# Patient Record
Sex: FEMALE | Race: WHITE | NOT HISPANIC OR LATINO | Employment: OTHER | URBAN - METROPOLITAN AREA
[De-identification: names, ages, dates, MRNs, and addresses within clinical notes are randomized per-mention and may not be internally consistent; named-entity substitution may affect disease eponyms.]

---

## 2024-03-21 ENCOUNTER — OFFICE VISIT (OUTPATIENT)
Dept: GASTROENTEROLOGY | Facility: CLINIC | Age: 69
End: 2024-03-21
Payer: MEDICARE

## 2024-03-21 VITALS — WEIGHT: 127 LBS | HEART RATE: 72 BPM | BODY MASS INDEX: 21.16 KG/M2 | HEIGHT: 65 IN

## 2024-03-21 DIAGNOSIS — K21.00 GASTROESOPHAGEAL REFLUX DISEASE WITH ESOPHAGITIS WITHOUT HEMORRHAGE: Primary | ICD-10-CM

## 2024-03-21 DIAGNOSIS — K58.2 IRRITABLE BOWEL SYNDROME WITH BOTH CONSTIPATION AND DIARRHEA: ICD-10-CM

## 2024-03-21 PROCEDURE — 99214 OFFICE O/P EST MOD 30 MIN: CPT | Performed by: INTERNAL MEDICINE

## 2024-03-21 PROCEDURE — 1159F MED LIST DOCD IN RCRD: CPT | Performed by: INTERNAL MEDICINE

## 2024-03-21 PROCEDURE — 1036F TOBACCO NON-USER: CPT | Performed by: INTERNAL MEDICINE

## 2024-03-21 RX ORDER — HYDROXYCHLOROQUINE SULFATE 200 MG/1
300 TABLET, FILM COATED ORAL DAILY
COMMUNITY

## 2024-03-21 RX ORDER — LUTEIN 6 MG
1 TABLET ORAL
COMMUNITY

## 2024-03-21 RX ORDER — EPINEPHRINE 0.22MG
100 AEROSOL WITH ADAPTER (ML) INHALATION DAILY
COMMUNITY
Start: 2021-06-07

## 2024-03-21 RX ORDER — MULTIVITAMIN WITH IRON
1 TABLET ORAL DAILY
COMMUNITY

## 2024-03-21 RX ORDER — CARISOPRODOL 350 MG/1
350 TABLET ORAL 3 TIMES DAILY PRN
COMMUNITY
Start: 2023-07-03

## 2024-03-21 RX ORDER — CHOLECALCIFEROL (VITAMIN D3) 50 MCG
2000 TABLET ORAL
COMMUNITY

## 2024-03-21 RX ORDER — ALBUTEROL SULFATE 90 UG/1
2 AEROSOL, METERED RESPIRATORY (INHALATION) EVERY 4 HOURS PRN
COMMUNITY
Start: 2023-08-07

## 2024-03-21 RX ORDER — ASPIRIN 81 MG/1
81 TABLET ORAL ONCE
COMMUNITY
Start: 2022-04-05

## 2024-03-21 RX ORDER — VITAMIN A 3000 MCG
25000 CAPSULE ORAL
COMMUNITY

## 2024-03-21 RX ORDER — ROSUVASTATIN CALCIUM 10 MG/1
10 TABLET, COATED ORAL NIGHTLY
COMMUNITY
Start: 2024-02-29

## 2024-03-21 RX ORDER — ESTRADIOL 0.1 MG/G
1 CREAM VAGINAL
COMMUNITY
Start: 2021-03-22

## 2024-03-21 RX ORDER — DEXTROMETHORPHAN HYDROBROMIDE, GUAIFENESIN 5; 100 MG/5ML; MG/5ML
650 LIQUID ORAL EVERY 8 HOURS PRN
COMMUNITY

## 2024-03-21 RX ORDER — IBUPROFEN 100 MG/5ML
1000 SUSPENSION, ORAL (FINAL DOSE FORM) ORAL
COMMUNITY

## 2024-03-21 RX ORDER — ESOMEPRAZOLE MAGNESIUM 40 MG/1
40 CAPSULE, DELAYED RELEASE ORAL
COMMUNITY

## 2024-03-21 ASSESSMENT — ENCOUNTER SYMPTOMS
PSYCHIATRIC NEGATIVE: 1
MUSCULOSKELETAL NEGATIVE: 1
CONSTITUTIONAL NEGATIVE: 1
ENDOCRINE NEGATIVE: 1
CARDIOVASCULAR NEGATIVE: 1
NEUROLOGICAL NEGATIVE: 1
GASTROINTESTINAL NEGATIVE: 1
HEMATOLOGIC/LYMPHATIC NEGATIVE: 1
ALLERGIC/IMMUNOLOGIC NEGATIVE: 1
EYES NEGATIVE: 1
RESPIRATORY NEGATIVE: 1

## 2024-03-21 NOTE — ASSESSMENT & PLAN NOTE
Patient is a 68-year-old with a history of coronary artery disease, chronic acid reflux, previous colonoscopy showing diverticulosis and he was due in 2026, and who relates some acid reflux breakthrough but no dysphagia and also more erratic loose bowel movements though she has been increased to caffeine and Diet Coke.  I have recommended she continue Metamucil and continue her medications.  There is been no changes in her medicines.  I suspect the increased caffeine intake is because of increasing symptoms of irritable bowel syndrome and/or the sorbitol.  I would recommend cutting back the Diet Coke and continuing Metamucil.  At this point I do not see any other intervention needed.  She is up-to-date on colonoscopy and is up-to-date on her medications.  All her questions and that of her  are addressed.    The  also asked me when he is due for colonoscopy and this was looked at and he is due in 2026.

## 2024-03-21 NOTE — PROGRESS NOTES
GERD  Irritable bowel    History of Present Illness:   Love Nevarez is a 68 y.o. female with PMHx of coronary artery disease, autoimmune disease on Plaquenil, chronic acid reflux and diverticulosis who presents to GI clinic for follow up.  Last seen around 2023 for routine follow-up.  She has had increasing bowel movements when more erratic loose stools when sometimes normal.  There is no change in her medicine no exposure to antibiotics.  She is up-to-date on colonoscopy due in 2026.  The only significant history is she has had increasing Diet Coke intake and caffeine.  She is also had mild increase in reflux.    Review of Systems  Review of Systems   Constitutional: Negative.    HENT: Negative.     Eyes: Negative.    Respiratory: Negative.     Cardiovascular: Negative.    Gastrointestinal: Negative.    Endocrine: Negative.    Genitourinary: Negative.    Musculoskeletal: Negative.    Skin: Negative.    Allergic/Immunologic: Negative.    Neurological: Negative.    Hematological: Negative.    Psychiatric/Behavioral: Negative.     All other systems reviewed and are negative.      Allergies  Allergies   Allergen Reactions    Amoxicillin-Pot Clavulanate Other     Elevated LFT's    Azithromycin GI Upset    Codeine Nausea Only    Dexlansoprazole Diarrhea, Headache and Nausea Only    Diltiazem Unknown     Headaches, GI upset    Esomeprazole Other, Headache and Nausea Only    Morphine Other    Pantoprazole GI Upset, Headache and Nausea Only    Verapamil Unknown     Headache, GI upset    Adhesive Tape-Silicones Rash       Medications  Current Outpatient Medications   Medication Instructions    acetaminophen (TYLENOL 8 HOUR) 650 mg, oral, Every 8 hours PRN    albuterol 90 mcg/actuation inhaler 2 puffs, inhalation, Every 4 hours PRN    ascorbic acid (VITAMIN C) 1,000 mg, oral, Daily RT    aspirin 81 mg, oral, Once    beta carotene (VITAMIN A) 25,000 Units, oral, Daily RT    calcium carbonate (CALCIUM 500 ORAL) 600 mg, oral,  Daily RT    carisoprodol (SOMA) 350 mg, oral, 3 times daily PRN    cholecalciferol (VITAMIN D-3) 2,000 Units, oral, Daily RT    coenzyme Q-10 100 mg, oral, Daily    esomeprazole (NEXIUM) 40 mg, oral, Daily before breakfast, PT TAKE DELFINO NEXIUM     estradiol (ESTRACE) 1 g, vaginal, Weekly    hydroxychloroquine (PLAQUENIL) 300 mg, oral, Daily    lutein 20 mg tablet 1 tablet, oral, Daily RT    multivitamin (Multiple Vitamins) tablet 1 tablet, oral, Daily    rosuvastatin (CRESTOR) 10 mg, oral, Nightly        Objective   Visit Vitals  Pulse 72      Physical Exam  Constitutional:       Appearance: Normal appearance.   Eyes:      Conjunctiva/sclera: Conjunctivae normal.   Cardiovascular:      Rate and Rhythm: Normal rate and regular rhythm.   Pulmonary:      Effort: Pulmonary effort is normal.      Breath sounds: Normal breath sounds.   Abdominal:      General: Abdomen is flat. Bowel sounds are normal.      Palpations: Abdomen is soft.   Musculoskeletal:         General: Normal range of motion.      Cervical back: Normal range of motion.   Neurological:      Mental Status: She is alert.           Lab Results   Component Value Date    WBC 6.0 03/30/2020    HGB 14.0 03/30/2020    HCT 42.7 03/30/2020     03/30/2020     Lab Results   Component Value Date     03/30/2020    K 4.4 03/30/2020     03/30/2020    CO2 26 03/30/2020    BUN 13 03/30/2020    CREATININE 0.62 03/30/2020    CALCIUM 9.7 03/30/2020    PROT 7.3 03/30/2020    BILITOT 0.5 03/30/2020    ALKPHOS 84 03/30/2020    ALT 27 03/30/2020    AST 27 03/30/2020    GLUCOSE 88 03/30/2020           Love Nevarez is a 68 y.o. female who presents to GI clinic for GERD and irritable bowel.    Irritable bowel syndrome with both constipation and diarrhea  Patient is a 68-year-old with a history of coronary artery disease, chronic acid reflux, previous colonoscopy showing diverticulosis and he was due in 2026, and who relates some acid reflux breakthrough but no  dysphagia and also more erratic loose bowel movements though she has been increased to caffeine and Diet Coke.  I have recommended she continue Metamucil and continue her medications.  There is been no changes in her medicines.  I suspect the increased caffeine intake is because of increasing symptoms of irritable bowel syndrome and/or the sorbitol.  I would recommend cutting back the Diet Coke and continuing Metamucil.  At this point I do not see any other intervention needed.  She is up-to-date on colonoscopy and is up-to-date on her medications.  All her questions and that of her  are addressed.    The  also asked me when he is due for colonoscopy and this was looked at and he is due in 2026.    Gastroesophageal reflux disease with esophagitis without hemorrhage  See discussion above for acid reflux.       Dilip Moznon MD

## 2025-04-08 ENCOUNTER — APPOINTMENT (OUTPATIENT)
Dept: GASTROENTEROLOGY | Facility: HOSPITAL | Age: 70
End: 2025-04-08
Payer: MEDICARE

## 2025-04-30 ENCOUNTER — APPOINTMENT (OUTPATIENT)
Facility: CLINIC | Age: 70
End: 2025-04-30
Payer: MEDICARE

## 2025-04-30 VITALS — HEART RATE: 70 BPM | HEIGHT: 65 IN | BODY MASS INDEX: 21.16 KG/M2 | WEIGHT: 127 LBS

## 2025-04-30 DIAGNOSIS — K21.9 GASTROESOPHAGEAL REFLUX DISEASE WITHOUT ESOPHAGITIS: Primary | ICD-10-CM

## 2025-04-30 PROCEDURE — 99213 OFFICE O/P EST LOW 20 MIN: CPT | Performed by: INTERNAL MEDICINE

## 2025-04-30 PROCEDURE — 3008F BODY MASS INDEX DOCD: CPT | Performed by: INTERNAL MEDICINE

## 2025-04-30 PROCEDURE — 1036F TOBACCO NON-USER: CPT | Performed by: INTERNAL MEDICINE

## 2025-04-30 PROCEDURE — 1159F MED LIST DOCD IN RCRD: CPT | Performed by: INTERNAL MEDICINE

## 2025-04-30 ASSESSMENT — ENCOUNTER SYMPTOMS
CARDIOVASCULAR NEGATIVE: 1
LOSS OF SENSATION IN FEET: 0
NEUROLOGICAL NEGATIVE: 1
DEPRESSION: 0
ALLERGIC/IMMUNOLOGIC NEGATIVE: 1
PSYCHIATRIC NEGATIVE: 1
GASTROINTESTINAL NEGATIVE: 1
MUSCULOSKELETAL NEGATIVE: 1
ENDOCRINE NEGATIVE: 1
RESPIRATORY NEGATIVE: 1
EYES NEGATIVE: 1
CONSTITUTIONAL NEGATIVE: 1
OCCASIONAL FEELINGS OF UNSTEADINESS: 0
HEMATOLOGIC/LYMPHATIC NEGATIVE: 1

## 2025-04-30 NOTE — PROGRESS NOTES
GERD    History of Present Illness:   Love Nevarez is a 69 y.o. female with PMHx of GERD who presents to GI clinic for follow up.  Last seen around 2024 for follow-up.  She is doing well without dysphagia, abdominal pain, nausea or vomiting.  She has occasional rapid bowel movements after coffee though she takes MiraLAX in the morning.  She has no complaints and is due for colonoscopy next year as well as upper endoscopy.    Review of Systems  Review of Systems   Constitutional: Negative.    HENT: Negative.     Eyes: Negative.    Respiratory: Negative.     Cardiovascular: Negative.    Gastrointestinal: Negative.    Endocrine: Negative.    Genitourinary: Negative.    Musculoskeletal: Negative.    Skin: Negative.    Allergic/Immunologic: Negative.    Neurological: Negative.    Hematological: Negative.    Psychiatric/Behavioral: Negative.     All other systems reviewed and are negative.      Allergies  RX Allergies[1]    Medications  Current Outpatient Medications   Medication Instructions    acetaminophen (TYLENOL 8 HOUR) 650 mg, Every 8 hours PRN    albuterol 90 mcg/actuation inhaler 2 puffs, Every 4 hours PRN    ascorbic acid (VITAMIN C) 1,000 mg, Daily RT    aspirin 81 mg, Once    beta carotene (VITAMIN A) 25,000 Units, Daily RT    calcium carbonate (CALCIUM 500 ORAL) 600 mg, Daily RT    cholecalciferol (VITAMIN D-3) 2,000 Units, Daily RT    coenzyme Q-10 100 mg, Daily    esomeprazole (NEXIUM) 40 mg, Daily before breakfast    estradiol (ESTRACE) 1 g, Once Weekly    hydroxychloroquine (PLAQUENIL) 300 mg, Daily    lutein 20 mg tablet 1 tablet, Daily RT    multivitamin (Multiple Vitamins) tablet 1 tablet, Daily    rosuvastatin (CRESTOR) 10 mg, Nightly        Objective   Visit Vitals  Pulse 70      Physical Exam  There is no exam as she is doing well    Lab Results   Component Value Date    WBC 6.0 03/30/2020    HGB 14.0 03/30/2020    HCT 42.7 03/30/2020     03/30/2020     Lab Results   Component Value Date      03/30/2020    K 4.4 03/30/2020     03/30/2020    CO2 26 03/30/2020    BUN 13 03/30/2020    CREATININE 0.62 03/30/2020    CALCIUM 9.7 03/30/2020    PROT 7.3 03/30/2020    BILITOT 0.5 03/30/2020    ALKPHOS 84 03/30/2020    ALT 27 03/30/2020    AST 27 03/30/2020    GLUCOSE 88 03/30/2020           Love Nevarez is a 69 y.o. female who presents to GI clinic for GERD.    Gastroesophageal reflux disease without esophagitis  Patient is a 69-year-old with chronic acid reflux and no dysphagia doing well on Nexium.  We will refill.  I do recommend repeat endoscopy next year at the same time as colonoscopy.  She is otherwise asymptomatic doing well.       Dilip Monzon MD                [1]   Allergies  Allergen Reactions    Amoxicillin-Pot Clavulanate Other     Elevated LFT's    Azithromycin GI Upset    Codeine Nausea Only    Dexlansoprazole Diarrhea, Headache and Nausea Only    Diltiazem Unknown     Headaches, GI upset    Esomeprazole Other, Headache and Nausea Only    Morphine Other    Pantoprazole GI Upset, Headache and Nausea Only    Verapamil Unknown     Headache, GI upset    Adhesive Tape-Silicones Rash

## 2025-04-30 NOTE — ASSESSMENT & PLAN NOTE
Patient is a 69-year-old with chronic acid reflux and no dysphagia doing well on Nexium.  We will refill.  I do recommend repeat endoscopy next year at the same time as colonoscopy.  She is otherwise asymptomatic doing well.